# Patient Record
Sex: FEMALE | Race: BLACK OR AFRICAN AMERICAN | NOT HISPANIC OR LATINO
[De-identification: names, ages, dates, MRNs, and addresses within clinical notes are randomized per-mention and may not be internally consistent; named-entity substitution may affect disease eponyms.]

---

## 2024-06-11 PROBLEM — Z00.00 ENCOUNTER FOR PREVENTIVE HEALTH EXAMINATION: Status: ACTIVE | Noted: 2024-06-11

## 2024-06-16 ENCOUNTER — NON-APPOINTMENT (OUTPATIENT)
Age: 34
End: 2024-06-16

## 2024-06-20 ENCOUNTER — APPOINTMENT (OUTPATIENT)
Dept: OBGYN | Facility: CLINIC | Age: 34
End: 2024-06-20

## 2024-06-20 VITALS
BODY MASS INDEX: 36.07 KG/M2 | WEIGHT: 196 LBS | HEIGHT: 62 IN | SYSTOLIC BLOOD PRESSURE: 154 MMHG | OXYGEN SATURATION: 100 % | DIASTOLIC BLOOD PRESSURE: 96 MMHG | HEART RATE: 75 BPM

## 2024-06-20 DIAGNOSIS — Z82.49 FAMILY HISTORY OF ISCHEMIC HEART DISEASE AND OTHER DISEASES OF THE CIRCULATORY SYSTEM: ICD-10-CM

## 2024-06-20 DIAGNOSIS — D21.9 BENIGN NEOPLASM OF CONNECTIVE AND OTHER SOFT TISSUE, UNSPECIFIED: ICD-10-CM

## 2024-06-20 DIAGNOSIS — D64.9 ANEMIA, UNSPECIFIED: ICD-10-CM

## 2024-06-20 DIAGNOSIS — Z80.0 FAMILY HISTORY OF MALIGNANT NEOPLASM OF DIGESTIVE ORGANS: ICD-10-CM

## 2024-06-20 DIAGNOSIS — N92.1 EXCESSIVE AND FREQUENT MENSTRUATION WITH IRREGULAR CYCLE: ICD-10-CM

## 2024-06-20 DIAGNOSIS — Z87.42 PERSONAL HISTORY OF OTHER DISEASES OF THE FEMALE GENITAL TRACT: ICD-10-CM

## 2024-06-20 PROCEDURE — 99205 OFFICE O/P NEW HI 60 MIN: CPT

## 2024-06-20 RX ORDER — MEDROXYPROGESTERONE ACETATE 10 MG/1
10 TABLET ORAL DAILY
Qty: 10 | Refills: 0 | Status: ACTIVE | COMMUNITY
Start: 2024-06-20 | End: 1900-01-01

## 2024-06-20 NOTE — HISTORY OF PRESENT ILLNESS
[Patient reported PAP Smear was normal] : Patient reported PAP Smear was normal [Patient reported colonoscopy was normal] : Patient reported colonoscopy was normal [N] : Patient does not use contraception [Y] : Positive pregnancy history [Menarche Age: ____] : age at menarche was [unfilled] [Yes] : Patient has concerns regarding sex [Currently Active] : currently active [Men] : men [No] : No [PapSmeardate] : 04/2024 [ColonoscopyDate] : 04/19/24 [TextBox_43] : Polyps removed [LMPDate] : 03/14/24 [PGxTotal] : 1 [[facility]] : 0 [PGHxABSpont] : 1 [FreeTextEntry1] : painful and discharge afterwards

## 2024-08-01 ENCOUNTER — NON-APPOINTMENT (OUTPATIENT)
Age: 34
End: 2024-08-01

## 2024-09-05 ENCOUNTER — TRANSCRIPTION ENCOUNTER (OUTPATIENT)
Age: 34
End: 2024-09-05

## 2024-09-05 ENCOUNTER — APPOINTMENT (OUTPATIENT)
Dept: OBGYN | Facility: CLINIC | Age: 34
End: 2024-09-05

## 2024-09-05 DIAGNOSIS — D21.9 BENIGN NEOPLASM OF CONNECTIVE AND OTHER SOFT TISSUE, UNSPECIFIED: ICD-10-CM

## 2024-09-05 DIAGNOSIS — N92.1 EXCESSIVE AND FREQUENT MENSTRUATION WITH IRREGULAR CYCLE: ICD-10-CM

## 2024-09-05 DIAGNOSIS — D64.9 ANEMIA, UNSPECIFIED: ICD-10-CM

## 2024-09-05 DIAGNOSIS — Z41.9 ENCOUNTER FOR PROCEDURE FOR PURPOSES OTHER THAN REMEDYING HEALTH STATE, UNSPECIFIED: ICD-10-CM

## 2024-09-05 PROCEDURE — 99214 OFFICE O/P EST MOD 30 MIN: CPT | Mod: 95

## 2024-09-05 RX ORDER — IBUPROFEN 800 MG/1
800 TABLET ORAL 3 TIMES DAILY
Qty: 1 | Refills: 1 | Status: ACTIVE | COMMUNITY
Start: 2024-09-05 | End: 1900-01-01

## 2024-09-05 RX ORDER — OXYCODONE 5 MG/1
5 TABLET ORAL EVERY 6 HOURS
Qty: 5 | Refills: 0 | Status: ACTIVE | COMMUNITY
Start: 2024-09-05 | End: 1900-01-01

## 2024-09-05 NOTE — HISTORY OF PRESENT ILLNESS
[FreeTextEntry1] : This visit was conducted with the use of interactive audio and video telecommunication system that permits real time communication between the provider and the patient. Verbal consent for the visit obtained on 09/05/2024  Originating site: Provider office, [address] [place] [address] Distant Site: Patient's Home, [address] [place] [address]   35 yo presents for preoperative visit prior to planned myomectomy.

## 2024-09-05 NOTE — DISCUSSION/SUMMARY
[FreeTextEntry1] : [facility] OJEDA 34 year old presents for follow-up prior to surgery. The associated risks, benefits, alternatives of the procedure have been outlined discussed and reviewed with the patient. These risks including but not limited to bleeding, infection, injury to adjacent organs, need for secondary surgery, incisional dehiscence, incisional hernia, change in bowel habits, change in urinary function, nerve injury, change in sexual function/impaired fertility, as well as the risk of heart and lung complications, stroke, DVT/PE and death were detailed.   The possibility of recurrent or continued symptoms, requiring continued medical management despite surgical intervention, was discussed.  We reviewed pain management, diet, enhanced recovery protocol. Medication prescribed.

## 2024-09-08 ENCOUNTER — TRANSCRIPTION ENCOUNTER (OUTPATIENT)
Age: 34
End: 2024-09-08

## 2024-09-09 ENCOUNTER — APPOINTMENT (OUTPATIENT)
Dept: OBGYN | Facility: HOSPITAL | Age: 34
End: 2024-09-09

## 2024-09-09 ENCOUNTER — RESULT REVIEW (OUTPATIENT)
Age: 34
End: 2024-09-09

## 2024-09-09 ENCOUNTER — TRANSCRIPTION ENCOUNTER (OUTPATIENT)
Age: 34
End: 2024-09-09

## 2024-09-10 ENCOUNTER — TRANSCRIPTION ENCOUNTER (OUTPATIENT)
Age: 34
End: 2024-09-10

## 2024-09-11 ENCOUNTER — NON-APPOINTMENT (OUTPATIENT)
Age: 34
End: 2024-09-11

## 2024-09-12 ENCOUNTER — NON-APPOINTMENT (OUTPATIENT)
Age: 34
End: 2024-09-12

## 2024-09-24 ENCOUNTER — APPOINTMENT (OUTPATIENT)
Dept: OBGYN | Facility: CLINIC | Age: 34
End: 2024-09-24
Payer: COMMERCIAL

## 2024-09-24 VITALS
DIASTOLIC BLOOD PRESSURE: 87 MMHG | SYSTOLIC BLOOD PRESSURE: 127 MMHG | OXYGEN SATURATION: 100 % | HEIGHT: 62 IN | HEART RATE: 98 BPM | BODY MASS INDEX: 36.25 KG/M2 | WEIGHT: 197 LBS

## 2024-09-24 DIAGNOSIS — Z86.018 PERSONAL HISTORY OF OTHER BENIGN NEOPLASM: ICD-10-CM

## 2024-09-24 DIAGNOSIS — Z41.9 ENCOUNTER FOR PROCEDURE FOR PURPOSES OTHER THAN REMEDYING HEALTH STATE, UNSPECIFIED: ICD-10-CM

## 2024-09-24 DIAGNOSIS — Z98.890 OTHER SPECIFIED POSTPROCEDURAL STATES: ICD-10-CM

## 2024-09-24 DIAGNOSIS — N92.1 EXCESSIVE AND FREQUENT MENSTRUATION WITH IRREGULAR CYCLE: ICD-10-CM

## 2024-09-24 PROCEDURE — 99024 POSTOP FOLLOW-UP VISIT: CPT

## 2024-09-24 NOTE — HISTORY OF PRESENT ILLNESS
[FreeTextEntry1] : 34 year old presents for postoperative visit s/p Robotic-assisted laparoscopic myomectomy and right paratubal cystectomy on 09/09/24.  She reports regular voiding and bowel movements, denies fevers/chills/dysuria.  Not taking pain medication. She reports being a little constipated which is normal for her.

## 2024-09-24 NOTE — DISCUSSION/SUMMARY
[FreeTextEntry1] : 34 year old s/p Robotic-assisted laparoscopic myomectomy and right paratubal cystectomy. presents for postoperative visit doing well  -given copy of pathology and operative report  -continue pelvic rest with modified activity  -follow up in 4 weeks  -disability paperwork filled out